# Patient Record
Sex: FEMALE
[De-identification: names, ages, dates, MRNs, and addresses within clinical notes are randomized per-mention and may not be internally consistent; named-entity substitution may affect disease eponyms.]

---

## 2021-12-27 ENCOUNTER — NURSE TRIAGE (OUTPATIENT)
Dept: OTHER | Facility: CLINIC | Age: 26
End: 2021-12-27

## 2021-12-27 NOTE — TELEPHONE ENCOUNTER
Subjective: Caller states \"Passed out and hit head, woke up to get water, has postural hypertension, unconscious for 7 minutes. \"     Current Symptoms: loss consciousness, body aches, chills, sore throat    Onset: 1 day ago; sudden    Associated Symptoms: N/A    Pain Severity: 4/10; dull, pressure; constant    Temperature: 98 orally    What has been tried: None    LMP: NA Pregnant: No    Recommended disposition: 911    Care advice provided, patient verbalizes understanding; denies any other questions or concerns; instructed to call back for any new or worsening symptoms. Patient/caller calling 911    This triage is a result of a call to [address] [place] [address]. Please do not respond to the triage nurse through this encounter. Any subsequent communication should be directly with the patient. Reason for Disposition   [1] Fainted > 15 minutes ago AND [2] still feels too weak or dizzy to stand    Answer Assessment - Initial Assessment Questions  1. ONSET: \"How long were you unconscious? \" (minutes) \"When did it happen? \"        7 minutes ago    2. CONTENT: \"What happened during period of unconsciousness? \" (e.g., seizure activity)         No history of seizures    3. MENTAL STATUS: \"Alert and oriented now? \" (oriented x 3 = name, month, location)         Alert and oriented    4. TRIGGER: \"What do you think caused the fainting? \" \"What were you doing just before you fainted? \"  (e.g., exercise, sudden standing up, prolonged standing)        Postural hypotension, history of  5. RECURRENT SYMPTOM: \"Have you ever passed out before? \" If Yes, ask: \"When was the last time? \" and \"What happened that time? \"         Yes, a while ago, treated symptoms at home    6. INJURY: \"Did you sustain any injury during the fall? \"         Hit head possibly, back of head hurts    7. CARDIAC SYMPTOMS: \"Have you had any of the following symptoms: chest pain, difficulty breathing, palpitations? \"        None    8.  NEUROLOGIC SYMPTOMS: \"Have you had any of the following symptoms: headache, numbness, vertigo, weakness? \"        Headache    9. GI SYMPTOMS: \"Have you had any of the following symptoms: abdominal pain, vomiting, diarrhea, blood in stools? \"        Loss control of bowels while unconscious    10. OTHER SYMPTOMS: \"Do you have any other symptoms? \"          None    11. PREGNANCY: \"Is there any chance you are pregnant? \" \"When was your last menstrual period? \"          No, lmp today-last day    Protocols used: [facility]